# Patient Record
Sex: FEMALE | Race: BLACK OR AFRICAN AMERICAN | NOT HISPANIC OR LATINO | Employment: FULL TIME | URBAN - METROPOLITAN AREA
[De-identification: names, ages, dates, MRNs, and addresses within clinical notes are randomized per-mention and may not be internally consistent; named-entity substitution may affect disease eponyms.]

---

## 2017-07-05 ENCOUNTER — ACNE/ROSACEA (OUTPATIENT)
Dept: URBAN - METROPOLITAN AREA CLINIC 31 | Facility: CLINIC | Age: 27
Setting detail: DERMATOLOGY
End: 2017-07-05

## 2017-07-05 PROCEDURE — 11900 INJECT SKIN LESIONS </W 7: CPT

## 2017-07-05 PROCEDURE — 99202 OFFICE O/P NEW SF 15 MIN: CPT

## 2017-07-06 ENCOUNTER — RX ONLY (RX ONLY)
Age: 27
End: 2017-07-06

## 2017-07-06 RX ORDER — TRETINOIN 0.25 MG/G
CREAM TOPICAL
Qty: 45 | Refills: 3
Start: 2017-07-06

## 2017-07-06 RX ORDER — KETOCONAZOLE 20 MG/G
CREAM TOPICAL
Qty: 60 | Refills: 3
Start: 2017-07-06

## 2020-12-04 ENCOUNTER — TELEPHONE ENCOUNTER (OUTPATIENT)
Dept: URBAN - METROPOLITAN AREA CLINIC 92 | Facility: CLINIC | Age: 30
End: 2020-12-04

## 2020-12-04 RX ORDER — ESOMEPRAZOLE MAGNESIUM 40 MG
TAKE 1 CAPSULE (40 MG) BY ORAL ROUTE ONCE DAILY FOR 90 DAYS CAPSULE,DELAYED RELEASE (ENTERIC COATED) ORAL 1
OUTPATIENT
Start: 2020-02-07 | End: 2021-02-01

## 2020-12-04 RX ORDER — PANTOPRAZOLE SODIUM 40 MG/1
TAKE ONE TABLET BY MOUTH DAILY TABLET, DELAYED RELEASE ORAL ONCE A DAY
Qty: 90 | Refills: 3 | OUTPATIENT
Start: 2020-12-07

## 2024-03-19 ENCOUNTER — OFFICE VISIT (OUTPATIENT)
Dept: UROLOGY | Facility: CLINIC | Age: 34
End: 2024-03-19
Payer: COMMERCIAL

## 2024-03-19 VITALS
HEIGHT: 62 IN | SYSTOLIC BLOOD PRESSURE: 121 MMHG | RESPIRATION RATE: 18 BRPM | WEIGHT: 193.13 LBS | DIASTOLIC BLOOD PRESSURE: 82 MMHG | BODY MASS INDEX: 35.54 KG/M2 | HEART RATE: 61 BPM

## 2024-03-19 DIAGNOSIS — R30.0 DYSURIA: ICD-10-CM

## 2024-03-19 DIAGNOSIS — N39.0 RECURRENT UTI: Primary | ICD-10-CM

## 2024-03-19 LAB
BILIRUB UR QL STRIP: NEGATIVE
GLUCOSE UR QL STRIP: NEGATIVE
KETONES UR QL STRIP: NEGATIVE
LEUKOCYTE ESTERASE UR QL STRIP: NEGATIVE
PH, POC UA: 7
POC BLOOD, URINE: NEGATIVE
POC NITRATES, URINE: NEGATIVE
POC RESIDUAL URINE VOLUME: 52 ML (ref 0–100)
PROT UR QL STRIP: NEGATIVE
SP GR UR STRIP: 1.01 (ref 1–1.03)
UROBILINOGEN UR STRIP-ACNC: 0.2 (ref 0.1–1.1)

## 2024-03-19 PROCEDURE — 87563 M. GENITALIUM AMP PROBE: CPT | Performed by: UROLOGY

## 2024-03-19 PROCEDURE — 51798 US URINE CAPACITY MEASURE: CPT | Mod: PBBFAC,PN | Performed by: UROLOGY

## 2024-03-19 PROCEDURE — 87798 DETECT AGENT NOS DNA AMP: CPT | Mod: 59 | Performed by: UROLOGY

## 2024-03-19 PROCEDURE — 99999 PR PBB SHADOW E&M-EST. PATIENT-LVL IV: CPT | Mod: PBBFAC,,, | Performed by: UROLOGY

## 2024-03-19 PROCEDURE — 99204 OFFICE O/P NEW MOD 45 MIN: CPT | Mod: S$PBB,,, | Performed by: UROLOGY

## 2024-03-19 PROCEDURE — 81003 URINALYSIS AUTO W/O SCOPE: CPT | Mod: PBBFAC,PN | Performed by: UROLOGY

## 2024-03-19 PROCEDURE — 87086 URINE CULTURE/COLONY COUNT: CPT | Performed by: UROLOGY

## 2024-03-19 RX ORDER — CETIRIZINE HYDROCHLORIDE 10 MG/1
1 TABLET ORAL EVERY MORNING
COMMUNITY

## 2024-03-19 RX ORDER — TRETINOIN 0.5 MG/G
0.5 CREAM TOPICAL NIGHTLY
COMMUNITY
Start: 2024-03-07 | End: 2024-06-12

## 2024-03-19 RX ORDER — CLINDAMYCIN PHOSPHATE 10 MG/G
1 GEL TOPICAL 2 TIMES DAILY
COMMUNITY
Start: 2024-03-07 | End: 2025-03-07

## 2024-03-19 RX ORDER — KETOCONAZOLE 20 MG/G
2 CREAM TOPICAL DAILY
COMMUNITY
Start: 2024-03-07 | End: 2024-06-12

## 2024-03-19 NOTE — PROGRESS NOTES
Chief Complaint   Patient presents with    New Patient Visit     Patient is currently experiencing burning/discomfort during urination       Referring Provider: Dr. Julian Perez      History of Present Illness:   Gaby Hernadez is a 33 y.o. female here for evaluation of New Patient Visit (Patient is currently experiencing burning/discomfort during urination)    3/19/24-34yo female, here for evaluation of dysuria. Pt has been having dysuria since having ureaplasma in 2022. She was retested and it returned normal, however repeat testing was a vaginal swab rather than the initial urethral swab that was done. Happens once every 2 days, more so in the evening. Drinking about a gallon of water a day, so it hasn't burned as much lately. She doesn't eat a lot of spice. She does eat a lot of fruit-- primarily berries. Occasional oranges and kiwi and grapes. She reports that it is like a scraping pain. She hasn't been sexually active since then. She did improve with the initial course of antibiotics.         Review of Systems   Constitutional:  Negative for chills and fever.   Eyes:  Negative for visual disturbance.   Respiratory:  Negative for shortness of breath.    Cardiovascular:  Negative for chest pain.   Gastrointestinal:  Positive for constipation (IBS).   Genitourinary:  Positive for dysuria.   Hematological:  Does not bruise/bleed easily.         Past Medical History:   Diagnosis Date    GERD (gastroesophageal reflux disease)     IBS (irritable bowel syndrome)     OAB (overactive bladder)        Past Surgical History:   Procedure Laterality Date    LOOP ELECTROSURGICAL EXCISION PROCEDURE (LEEP)  12/13/2023    PARATHYROIDECTOMY      TONSILLECTOMY         Family History   Problem Relation Age of Onset    Hypertension Maternal Grandmother     Colon cancer Maternal Grandfather        Social History     Tobacco Use    Smoking status: Never    Smokeless tobacco: Never   Substance Use Topics    Alcohol use: Not  Currently    Drug use: Never       Current Outpatient Medications   Medication Sig Dispense Refill    albuterol (PROVENTIL/VENTOLIN HFA) 90 mcg/actuation inhaler Inhale 2 puffs into the lungs.      ascorbic acid, vitamin C, 500 mg/5 mL Liqd Take 500 mg by mouth once daily.      cetirizine (ZYRTEC) 10 MG tablet Take 1 tablet by mouth every morning.      clindamycin phosphate 1% (CLINDAGEL) 1 % gel Apply 1 % topically 2 (two) times daily.      ketoconazole (NIZORAL) 2 % cream Apply 2 % topically once daily.      tretinoin (RETIN-A) 0.05 % cream Apply 0.5 % topically nightly.      desogestreL-ethinyl estradioL (ENSKYCE) 0.15-0.03 mg per tablet Take 1 tablet by mouth once daily. (Patient not taking: Reported on 3/19/2024) 28 tablet 11     No current facility-administered medications for this visit.       Review of patient's allergies indicates:   Allergen Reactions    Penicillins Itching and Rash    Amoxicillin Rash    Gluten Other (See Comments)     Worsens digestive issues/constipation    Milk containing products (dairy) Other (See Comments)     constipation       Physical Exam  Vitals:    03/19/24 0917   BP: 121/82   Pulse: 61   Resp: 18     General: Well-developed, well-nourished, in no acute distress  HEENT: Normocephalic, atraumatic, extraocular movements intact  Neck: Supple, no supraclavicular or cervical lymphadenopathy, trachea midline  Respirations: even and unlabored  Back: midline spine, No CVA tenderness  Abdomen: soft, Non-tender, non-distended, no palpable masses, no rebound or guarding  : Normal external female genitalia without lesions. Orthotopic urethral meatus. healthy vaginal mucosa. No prolapse  Extremities: moves all equally, no clubbing, cyanosis or edema  Skin: Warm and dry. No lesions  Psych: normal affect  Neuro: Alert and oriented x 3. Cranial nerves II-XII intact      Urinalysis  Negative for blood, LE, nit      Assessment:  1. Recurrent UTI  Ambulatory referral/consult to Urology    POCT  Bladder Scan    POCT Urinalysis, Dipstick, Automated, W/O Scope    Urine culture    Mycoplasma genitalium Molecular Detection, PCR Urine    Ureaplasma PCR Urine    Mycoplasma genitalium Molecular Detection, PCR Urine    Ureaplasma PCR Urine    CANCELED: Mycoplasma genitalium Molecular Detection, PCR Urine    CANCELED: Ureaplasma PCR Urine      2. Dysuria              Plan:   Recurrent UTI  -     Ambulatory referral/consult to Urology  -     POCT Bladder Scan  -     POCT Urinalysis, Dipstick, Automated, W/O Scope  -     Urine culture  -     Cancel: Mycoplasma genitalium Molecular Detection, PCR Urine; Future; Expected date: 03/19/2024  -     Cancel: Ureaplasma PCR Urine; Future; Expected date: 03/19/2024  -     Mycoplasma genitalium Molecular Detection, PCR Urine; Future; Expected date: 03/20/2024  -     Ureaplasma PCR Urine; Future; Expected date: 03/20/2024  -     Mycoplasma genitalium Molecular Detection, PCR Urine  -     Ureaplasma PCR Urine    Dysuria      If all cultures are negative, will move forward with cysto    Follow up for Cystoscopy.

## 2024-03-20 DIAGNOSIS — N39.0 RECURRENT UTI: Primary | ICD-10-CM

## 2024-03-21 LAB
BACTERIA UR CULT: NO GROWTH
M GENITALIUM DNA UR QL NAA+PROBE: NEGATIVE
SPECIMEN SOURCE: NORMAL

## 2024-03-23 LAB
SPECIMEN SOURCE: NORMAL
U PARVUM DNA SPEC QL NAA+PROBE: NEGATIVE
U UREALYTICUM DNA SPEC QL NAA+PROBE: NEGATIVE

## 2024-04-09 ENCOUNTER — PROCEDURE VISIT (OUTPATIENT)
Dept: UROLOGY | Facility: CLINIC | Age: 34
End: 2024-04-09
Payer: COMMERCIAL

## 2024-04-09 VITALS
DIASTOLIC BLOOD PRESSURE: 77 MMHG | WEIGHT: 193.56 LBS | SYSTOLIC BLOOD PRESSURE: 119 MMHG | HEART RATE: 58 BPM | BODY MASS INDEX: 35.62 KG/M2 | HEIGHT: 62 IN | RESPIRATION RATE: 18 BRPM

## 2024-04-09 DIAGNOSIS — R30.0 DYSURIA: Primary | ICD-10-CM

## 2024-04-09 LAB
BILIRUB UR QL STRIP: NEGATIVE
GLUCOSE UR QL STRIP: NEGATIVE
KETONES UR QL STRIP: NEGATIVE
LEUKOCYTE ESTERASE UR QL STRIP: NEGATIVE
PH, POC UA: 8.5
POC BLOOD, URINE: NEGATIVE
POC NITRATES, URINE: NEGATIVE
PROT UR QL STRIP: NEGATIVE
SP GR UR STRIP: 1.01 (ref 1–1.03)
UROBILINOGEN UR STRIP-ACNC: 0.2 (ref 0.1–1.1)

## 2024-04-09 PROCEDURE — 81003 URINALYSIS AUTO W/O SCOPE: CPT | Mod: PBBFAC,PN | Performed by: UROLOGY

## 2024-04-09 PROCEDURE — 52000 CYSTOURETHROSCOPY: CPT | Mod: PBBFAC,PN | Performed by: UROLOGY

## 2024-04-09 PROCEDURE — 52000 CYSTOURETHROSCOPY: CPT | Mod: S$PBB,,, | Performed by: UROLOGY

## 2024-04-09 RX ORDER — LIDOCAINE HYDROCHLORIDE 20 MG/ML
JELLY TOPICAL
Status: COMPLETED | OUTPATIENT
Start: 2024-04-09 | End: 2024-04-09

## 2024-04-09 RX ORDER — CIPROFLOXACIN 500 MG/1
500 TABLET ORAL
Status: COMPLETED | OUTPATIENT
Start: 2024-04-09 | End: 2024-04-09

## 2024-04-09 RX ADMIN — CIPROFLOXACIN 500 MG: 500 TABLET ORAL at 08:04

## 2024-04-09 RX ADMIN — LIDOCAINE HYDROCHLORIDE 10 ML: 20 JELLY TOPICAL at 08:04

## 2024-04-09 NOTE — PROGRESS NOTES
.Patient came in for a cystoscopy, Ciprofloxacin 500 mg tab to be administered orally to help prevent infection. Confirmed patient's allergies, Ciprofloxacin 500 mg tab administered as ordered. Pt tolerated medication administration well. Patient agreed to wait 15 minutes after medication administration in the clinic and to report any adverse reactions.        Cedric Branham RN

## 2024-04-09 NOTE — PROCEDURES
Chief Complaint   Patient presents with    Procedure     Cystoscopy.         History of Present Illness:   Gaby Hernadez is a 33 y.o. female here for evaluation of Procedure (Cystoscopy.)    4/9/24-Here for cysto. Ureaplasma/mycoplasma testing negative.   3/19/24-34yo female, here for evaluation of dysuria. Pt has been having dysuria since having ureaplasma in 2022. She was retested and it returned normal, however repeat testing was a vaginal swab rather than the initial urethral swab that was done. Happens once every 2 days, more so in the evening. Drinking about a gallon of water a day, so it hasn't burned as much lately. She doesn't eat a lot of spice. She does eat a lot of fruit-- primarily berries. Occasional oranges and kiwi and grapes. She reports that it is like a scraping pain. She hasn't been sexually active since then. She did improve with the initial course of antibiotics.         Review of Systems   Constitutional:  Negative for chills and fever.   Eyes:  Negative for visual disturbance.   Respiratory:  Negative for shortness of breath.    Cardiovascular:  Negative for chest pain.   Gastrointestinal:  Positive for constipation (IBS).   Genitourinary:  Positive for dysuria.   Hematological:  Does not bruise/bleed easily.         Past Medical History:   Diagnosis Date    GERD (gastroesophageal reflux disease)     IBS (irritable bowel syndrome)     OAB (overactive bladder)        Past Surgical History:   Procedure Laterality Date    LOOP ELECTROSURGICAL EXCISION PROCEDURE (LEEP)  12/13/2023    PARATHYROIDECTOMY      TONSILLECTOMY         Family History   Problem Relation Age of Onset    Hypertension Maternal Grandmother     Colon cancer Maternal Grandfather        Social History     Tobacco Use    Smoking status: Never    Smokeless tobacco: Never   Substance Use Topics    Alcohol use: Not Currently    Drug use: Never       Current Outpatient Medications   Medication Sig Dispense Refill    albuterol  (PROVENTIL/VENTOLIN HFA) 90 mcg/actuation inhaler Inhale 2 puffs into the lungs.      ascorbic acid, vitamin C, 500 mg/5 mL Liqd Take 500 mg by mouth once daily.      cetirizine (ZYRTEC) 10 MG tablet Take 1 tablet by mouth every morning.      clindamycin phosphate 1% (CLINDAGEL) 1 % gel Apply 1 % topically 2 (two) times daily.      desogestreL-ethinyl estradioL (ENSKYCE) 0.15-0.03 mg per tablet Take 1 tablet by mouth once daily. 28 tablet 11    ketoconazole (NIZORAL) 2 % cream Apply 2 % topically once daily.      tretinoin (RETIN-A) 0.05 % cream Apply 0.5 % topically nightly.       No current facility-administered medications for this visit.       Review of patient's allergies indicates:   Allergen Reactions    Penicillins Itching and Rash    Amoxicillin Rash    Gluten Other (See Comments)     Worsens digestive issues/constipation    Milk containing products (dairy) Other (See Comments)     constipation       Physical Exam  Vitals:    04/09/24 0817   BP: 119/77   Pulse: (!) 58   Resp: 18     General: Well-developed, well-nourished, in no acute distress  HEENT: Normocephalic, atraumatic, extraocular movements intact  Neck: Supple, no supraclavicular or cervical lymphadenopathy, trachea midline  Respirations: even and unlabored  Back: midline spine, No CVA tenderness  Abdomen: soft, Non-tender, non-distended, no palpable masses, no rebound or guarding  : Normal external female genitalia without lesions. Orthotopic urethral meatus. healthy vaginal mucosa. No prolapse  Extremities: moves all equally, no clubbing, cyanosis or edema  Skin: Warm and dry. No lesions  Psych: normal affect  Neuro: Alert and oriented x 3. Cranial nerves II-XII intact      Urinalysis  Negative for blood, LE, nit    Procedure: Cystoscopy      Procedure in detail:   Informed consent was obtained. The patient's genitalia was prepped and draped in the usual sterile fashion. Lidocaine jelly was administered per urethra. I utilized the flexible  cystoscope and advanced it into the urethral meatus. Bladder access was obtained. Systematic review of the bladder was performed, noting no stones, foreign bodies or masses. Bilateral ureteral orifices were visualized and noted to be effluxing clear urine. Retroflexion was utilized to visualize the bladder neck, noting no lesions. The scope was slowly backed out of the urethra, noting no urethral lesions. The patient tolerated the procedure well. Estimated blood loss was 0cc.       Assessment:  1. Dysuria  POCT Urinalysis, Dipstick, Automated, W/O Scope            Plan:   Dysuria  -     POCT Urinalysis, Dipstick, Automated, W/O Scope    Other orders  -     ciprofloxacin HCl tablet 500 mg  -     LIDOcaine HCl 2% urojet    Discussed food journal and bladder irritants.     Follow up if symptoms worsen or fail to improve.

## 2024-06-17 ENCOUNTER — CLINICAL SUPPORT (OUTPATIENT)
Dept: REHABILITATION | Facility: HOSPITAL | Age: 34
End: 2024-06-17
Attending: STUDENT IN AN ORGANIZED HEALTH CARE EDUCATION/TRAINING PROGRAM
Payer: COMMERCIAL

## 2024-06-17 DIAGNOSIS — R10.2 PELVIC PAIN: ICD-10-CM

## 2024-06-17 DIAGNOSIS — M62.89 PELVIC FLOOR DYSFUNCTION: Primary | ICD-10-CM

## 2024-06-17 PROCEDURE — 97530 THERAPEUTIC ACTIVITIES: CPT | Mod: PN

## 2024-06-17 PROCEDURE — 97161 PT EVAL LOW COMPLEX 20 MIN: CPT | Mod: PN

## 2024-06-17 NOTE — PATIENT INSTRUCTIONS
"    URINARY URGE CONTROL   What to do when you "gotta go, gotta go"     FREEZE, BREATHE, SQUEEZE, repeat  Do this when you experience a strong urge to urinate and feel like you are going to leak to stop yourself from leaking.      FIRST - FREEZE: Do your best not to panic or rush to the toilet! You are more likely to leak if you do. Stop whatever you are doing, stand or sit quietly, and stay as calm as possible.     SECOND - BREATHE: Relax and take a few good deep breaths, letting it out slowly. This helps you further calm the nervous system and settles your bladder. Try thinking of something else to distract yourself from the urge (example: list categories of items like animals, fruits, cars, etc.)     THIRD - SQUEEZE: Do 5-10 "Quick Flicks" (quick LIFT and squeeze of pelvic floor muscles, followed by a full DROP). Pelvic floor contractions send a message to the bladder to relax and hold urine. Continue doing your best to remain calm and distract yourself from the urge.     FINALLY - REPEAT: Repeat this as many times as you need to on the way to the bathroom (i.e., every time the urge comes back). We only want to be walking calmly to the bathroom once the urge has passed. When you get inside and close the door behind you, repeat this process one last time so that you have enough time to get to the toilet and pull your pants down without rushing.        Remember......"Control your bladder before it controls YOU!"       "

## 2024-06-17 NOTE — PROGRESS NOTES
[facility]   Pelvic Health Physical Therapy Initial Evaluation     Date: 2024   Name: Gaby Hernadez  Clinic Number: [id number]    Therapy Diagnosis:   Encounter Diagnoses   Name Primary?    Pelvic pain     Pelvic floor dysfunction Yes     Physician: Julian Perez MD    Physician Orders: PT Eval and Treat   Medical Diagnosis from Referral: Pelvic pain [R10.2]   Evaluation Date: 2024  Authorization Period Expiration: 2024  Plan of Care Expiration: 2024  Progress Note Due: 2024  Visit # / Visits authorized:    FOTO: Issued Visit #: 1/3     Precautions: Standard    Time In: 1:40  Time Out: 2:20  Total Appointment Time (timed & untimed codes): 40 minutes    SUBJECTIVE     Date of onset: at least 6 years.     History of current condition - Gaby reports: having overactive bladder and constipation symptoms over the years. She reports fecal and urinary urge incontinence. She noticed symptoms were not improving so she would like to start Pelvic Floor PT.   Stool softner and prebiotic every day.    OB/GYN History:   Sexually active? No  Pain with vaginal exams, intercourse or tampon use? No    Bladder/Bowel History: urinary incontinence, trouble holding back gas/feces, and urinary frequency   Frequency of urination:   Daytime: depends on water intake, frequently, 12          Nighttime: 0  Difficulty initiating urine stream: No  Urine stream: strong  Complete emptying: Yes  Bladder leakage: Yes  Frequency of incidents: urge incontinence  Amount leaked (urine): teaspoon(s) and small squirt   Urinary Urgency: Yes, Able to delay the urge for at least 1-2 minute(s).  Frequency of bowel movements:  once every 3-4 days   Difficulty initiating BM: Yes  Quality/Shape of BM: [place] Stool Chart Type 1 or 2  Does Patient Feel Empty after BM? No  Fiber Supplements or Laxative Use? Yes  Colon leakage: Yes  Frequency of incidents: urge   Amount leaked (bowels): small  amount  Form of protection: panty liner  Number of pads required in 24 hours: 1-2     Pain:  No pain reported today.    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  Prior Level of Function: Pt was independent with all ADLs and iADLs without pain, no reports of incontinence of bowel or bladder.  Current Level of Function: Unable to control urinary or fecal urge at times.     Types of fluid intake: 55-60 oz water, juice, lemonade  Diet: plant-based foods recently to help with bowels   Habitus: well developed, well nourished  Abuse/Neglect: No     Patients goals: strengthen pelvic floor      Medical History: Gaby  has a past medical history of GERD (gastroesophageal reflux disease), IBS (irritable bowel syndrome), and OAB (overactive bladder).     Surgical History: Gaby Hernadez  has a past surgical history that includes Parathyroidectomy; Tonsillectomy; and Loop electrosurgical excision procedure (LEEP) (12/13/2023).    Medications: Gaby has a current medication list which includes the following prescription(s): albuterol, cetirizine, clindamycin phosphate 1%, and desogestrel-ethinyl estradiol.    Allergies:   Review of patient's allergies indicates:   Allergen Reactions    Penicillins Itching and Rash    Amoxicillin Rash    Gluten Other (See Comments)     Worsens digestive issues/constipation    Milk containing products (dairy) Other (See Comments)     constipation        OBJECTIVE     See EMR under MEDIA for written consent provided 6/17/2024  Chaperone: declined    ORTHO SCREEN  Posture in sitting: slouched   Posture in standing: WNL  Pelvic alignment: no sign of deviations noted in supine   Adductor Palpation: Not assessed today     ABDOMINAL WALL ASSESSMENT  Palpation: increased tension  and hypertonic  Abdominal strength:  Transverse abdominus: fair to good  Scarring: none noted   Diastasis: absent with curl up test      BREATHING MECHANICS ASSESSMENT   Thorax Assessment During Deep Respiration: WNL excursion of ribcage and WNL excursion of abdominal wall    VAGINAL PELVIC FLOOR EXAM    EXTERNAL ASSESSMENT  Introitus: WNL  Skin condition: WNL  Scarring: none   Sensation: WNL   Pain: none  Voluntary contraction: visible lift  Voluntary relaxation: visible drop  Involuntary contraction: reflex tightening  Bearing down: nil  Perineal descent: absent      INTERNAL ASSESSMENT  Pain: none   Sensation: able to localized pressure appropriately   Vaginal vault: stenotic   Muscle Bulk: hypertonus   Muscle Power: 2/5  Muscle Endurance: 3 sec  # Reps To Fatigue: 2    Fast Contractions in 10 seconds: Not assessed today      Quality of contraction: decreased hold   Specificity: WNL   Coordination: tends to hold breath during PFM contration   Prolapse check: none    Limitation/Restriction for FOTO Pelvic Floor Survey    Therapist reviewed FOTO scores for Gaby Hernadez on 6/17/2024.   FOTO documents entered into EPIC - see Media section.    Limitation Score:          TREATMENT     Total Treatment time (time-based codes) separate from Evaluation: 10 minutes     Therapeutic Activity to improve dynamic functional performance for 10 minutes including:    Reviewed HEP:  Diaphragmatic breathing  TA contraction  Bridges  Yajaira pose    Education on urge control techniques     PATIENT EDUCATION AND HOME EXERCISES     Education provided:   general anatomy/physiology of urinary/ bowel  system, benefits of treatment, and alternative methods of treatment were discussed with the patient. Additionally, anatomy/physiology of pelvic floor, posture/body mechanices, diaphragmatic breathing, isometric abdominal exercises, kegels, and urge control  were reviewed.     Written Home Exercises provided: yes.  Exercises were reviewed and Gaby was able to demonstrate  them prior to the end of the session. Gaby demonstrated good  understanding of the education provided. See EMR under Patient Instructions for exercises provided during therapy sessions.    ASSESSMENT     Gaby is a 33 y.o. female referred to outpatient Physical Therapy with a medical diagnosis of Pelvic pain [R10.2] . Pt presents with altered posture, poor knowledge of body mechanics and posture, poor trunk stability, decreased pelvic muscle strength, decreased endurance of the pelvic muscles, and increased frequency of urination.       Patient prognosis is Good.   Patient will benefit from skilled outpatient Physical Therapy to address the deficits stated above and in the chart below, provide patient/family education, and to maximize patient's level of independence.     Plan of care discussed with patient: Yes  Patient's spiritual, cultural and educational needs considered and patient is agreeable to the plan of care and goals as stated below:     Anticipated Barriers for therapy: see PMHx    Medical Necessity is demonstrated by the following:    History  Co-morbidities and personal factors that may impact the plan of care [x] LOW: no personal factors / co-morbidities  [] MODERATE: 1-2 personal factors / co-morbidities  [] HIGH: 3+ personal factors / co-morbidities    Moderate / High Support Documentation:   Co-morbidities affecting plan of care: n/a    Personal Factors:   no deficits     Examination  Body Structures and Functions, activity limitations and participation restrictions that may impact the plan of care [] LOW: addressing 1-2 elements  [x] MODERATE: 3+ elements  [] HIGH: 4+ elements (please support below)    Moderate / High Support Documentation: altered posture, poor knowledge of body mechanics and posture, poor trunk stability, decreased pelvic muscle strength, decreased endurance of the pelvic muscles, and increased frequency of urination     Clinical Presentation [x] LOW: stable  [] MODERATE:  Evolving  [] HIGH: Unstable     Decision Making/ Complexity Score: low        Goals:  Short Term Goals: 6 weeks   - Pt will demonstrate excellent knowledge and adherence to HEP to facilitate optimal recovery.  - Pt will demonstrate proper PFM contraction, relaxation, and lengthening coordinated with TA and breath for improved muscle coordination needed for functional activity.  - Pt will report a 25% reduction in frequency of leakage to demonstrate improved pelvic floor coordination needed for continence with ADLs.    Long Term Goals: 10 weeks   - Pt will demonstrate excellent knowledge and adherence to HEP for continued self-maintenance of symptoms.  - Pt will report FOTO score of 8% limited or less indicating clinically relevant increase in function.  - Pt will report voiding interval of 2-3 hours for improved ADL tolerance.  - Pt to be able to delay the urge to urinate at least 15 minutes with a strong urge to urinate in order to make it to the bathroom without leaking.  - Pt will report no incidence of urinary and fecal incontinence 6/7 days for improved hygiene and ADL/IADL tolerance.   - Pt will demonstrate PFM strength of at least 3/5 MMT for improved strength needed to maintain continence.   - Pt will be able to correctly and consistently explain urge control strategies to demonstrate understanding of these strategies and decrease likelihood of leakage.    PLAN     Plan of care Certification: 6/17/2024 to 08/26/2024.    Outpatient Physical Therapy 1 time(s) every 1-2 week(s) for 10 weeks to include the following interventions: Cervical/Lumbar Traction, Electrical Stimulation TENS/IFC, Manual Therapy, Moist Heat/ Ice, Neuromuscular Re-ed, Patient Education, Self Care, Therapeutic Activities, Therapeutic Exercise, and ASTYM, and FDN .     Nova Nathan, PT      I CERTIFY THE NEED FOR THESE SERVICES FURNISHED UNDER THIS PLAN OF TREATMENT AND WHILE UNDER MY CARE   Physician's comments:     Physician's  Signature: ___________________________________________________

## 2024-06-17 NOTE — PLAN OF CARE
[facility]   Pelvic Health Physical Therapy Initial Evaluation     Date: 2024   Name: Gaby Hernadez  Clinic Number: [id number]    Therapy Diagnosis:   Encounter Diagnoses   Name Primary?    Pelvic pain     Pelvic floor dysfunction Yes     Physician: Julian Perez MD    Physician Orders: PT Eval and Treat   Medical Diagnosis from Referral: Pelvic pain [R10.2]   Evaluation Date: 2024  Authorization Period Expiration: 2024  Plan of Care Expiration: 2024  Progress Note Due: 2024  Visit # / Visits authorized:    FOTO: Issued Visit #: 1/3     Precautions: Standard    Time In: 1:40  Time Out: 2:20  Total Appointment Time (timed & untimed codes): 40 minutes    SUBJECTIVE     Date of onset: at least 6 years.     History of current condition - Gaby reports: having overactive bladder and constipation symptoms over the years. She reports fecal and urinary urge incontinence. She noticed symptoms were not improving so she would like to start Pelvic Floor PT.   Stool softner and prebiotic every day.    OB/GYN History:   Sexually active? No  Pain with vaginal exams, intercourse or tampon use? No    Bladder/Bowel History: urinary incontinence, trouble holding back gas/feces, and urinary frequency   Frequency of urination:   Daytime: depends on water intake, frequently, 12          Nighttime: 0  Difficulty initiating urine stream: No  Urine stream: strong  Complete emptying: Yes  Bladder leakage: Yes  Frequency of incidents: urge incontinence  Amount leaked (urine): teaspoon(s) and small squirt   Urinary Urgency: Yes, Able to delay the urge for at least 1-2 minute(s).  Frequency of bowel movements: once every 3-4 days   Difficulty initiating BM: Yes  Quality/Shape of BM: [place] Stool Chart Type 1 or 2  Does Patient Feel Empty after BM? No  Fiber Supplements or Laxative Use? Yes  Colon leakage: Yes  Frequency of incidents: urge   Amount leaked (bowels): small  amount  Form of protection: panty liner  Number of pads required in 24 hours: 1-2     Pain:  No pain reported today.    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  Prior Level of Function: Pt was independent with all ADLs and iADLs without pain, no reports of incontinence of bowel or bladder.  Current Level of Function: Unable to control urinary or fecal urge at times.     Types of fluid intake: 55-60 oz water, juice, lemonade  Diet: plant-based foods recently to help with bowels   Habitus: well developed, well nourished  Abuse/Neglect: No     Patients goals: strengthen pelvic floor      Medical History: Gaby  has a past medical history of GERD (gastroesophageal reflux disease), IBS (irritable bowel syndrome), and OAB (overactive bladder).     Surgical History: Gaby Hernadez  has a past surgical history that includes Parathyroidectomy; Tonsillectomy; and Loop electrosurgical excision procedure (LEEP) (12/13/2023).    Medications: Gaby has a current medication list which includes the following prescription(s): albuterol, cetirizine, clindamycin phosphate 1%, and desogestrel-ethinyl estradiol.    Allergies:   Review of patient's allergies indicates:   Allergen Reactions    Penicillins Itching and Rash    Amoxicillin Rash    Gluten Other (See Comments)     Worsens digestive issues/constipation    Milk containing products (dairy) Other (See Comments)     constipation        OBJECTIVE     See EMR under MEDIA for written consent provided 6/17/2024  Chaperone: declined    ORTHO SCREEN  Posture in sitting: slouched   Posture in standing: WNL  Pelvic alignment: no sign of deviations noted in supine   Adductor Palpation: Not assessed today     ABDOMINAL WALL ASSESSMENT  Palpation: increased tension  and hypertonic  Abdominal strength:  Transverse abdominus: fair to good  Scarring: none noted   Diastasis: absent with curl up test      BREATHING MECHANICS ASSESSMENT   Thorax Assessment During Deep Respiration: WNL excursion of ribcage and WNL excursion of abdominal wall    VAGINAL PELVIC FLOOR EXAM    EXTERNAL ASSESSMENT  Introitus: WNL  Skin condition: WNL  Scarring: none   Sensation: WNL   Pain: none  Voluntary contraction: visible lift  Voluntary relaxation: visible drop  Involuntary contraction: reflex tightening  Bearing down: nil  Perineal descent: absent      INTERNAL ASSESSMENT  Pain: none   Sensation: able to localized pressure appropriately   Vaginal vault: stenotic   Muscle Bulk: hypertonus   Muscle Power: 2/5  Muscle Endurance: 3 sec  # Reps To Fatigue: 2    Fast Contractions in 10 seconds: Not assessed today      Quality of contraction: decreased hold   Specificity: WNL   Coordination: tends to hold breath during PFM contration   Prolapse check: none    Limitation/Restriction for FOTO Pelvic Floor Survey    Therapist reviewed FOTO scores for Gaby Hernadez on 6/17/2024.   FOTO documents entered into EPIC - see Media section.    Limitation Score:          TREATMENT     Total Treatment time (time-based codes) separate from Evaluation: 10 minutes     Therapeutic Activity to improve dynamic functional performance for 10 minutes including:    Reviewed HEP:  Diaphragmatic breathing  TA contraction  Bridges  Yajaira pose    Education on urge control techniques     PATIENT EDUCATION AND HOME EXERCISES     Education provided:   general anatomy/physiology of urinary/ bowel  system, benefits of treatment, and alternative methods of treatment were discussed with the patient. Additionally, anatomy/physiology of pelvic floor, posture/body mechanices, diaphragmatic breathing, isometric abdominal exercises, kegels, and urge control were reviewed.     Written Home Exercises provided: yes.  Exercises were reviewed and Gaby was able to demonstrate them  prior to the end of the session. Gaby demonstrated good  understanding of the education provided. See EMR under Patient Instructions for exercises provided during therapy sessions.    ASSESSMENT     Gaby is a 33 y.o. female referred to outpatient Physical Therapy with a medical diagnosis of Pelvic pain [R10.2] . Pt presents with altered posture, poor knowledge of body mechanics and posture, poor trunk stability, decreased pelvic muscle strength, decreased endurance of the pelvic muscles, and increased frequency of urination.       Patient prognosis is Good.   Patient will benefit from skilled outpatient Physical Therapy to address the deficits stated above and in the chart below, provide patient/family education, and to maximize patient's level of independence.     Plan of care discussed with patient: Yes  Patient's spiritual, cultural and educational needs considered and patient is agreeable to the plan of care and goals as stated below:     Anticipated Barriers for therapy: see PMHx    Medical Necessity is demonstrated by the following:    History  Co-morbidities and personal factors that may impact the plan of care [x] LOW: no personal factors / co-morbidities  [] MODERATE: 1-2 personal factors / co-morbidities  [] HIGH: 3+ personal factors / co-morbidities    Moderate / High Support Documentation:   Co-morbidities affecting plan of care: n/a    Personal Factors:   no deficits     Examination  Body Structures and Functions, activity limitations and participation restrictions that may impact the plan of care [] LOW: addressing 1-2 elements  [x] MODERATE: 3+ elements  [] HIGH: 4+ elements (please support below)    Moderate / High Support Documentation: altered posture, poor knowledge of body mechanics and posture, poor trunk stability, decreased pelvic muscle strength, decreased endurance of the pelvic muscles, and increased frequency of urination     Clinical Presentation [x] LOW: stable  [] MODERATE:  Evolving  [] HIGH: Unstable     Decision Making/ Complexity Score: low        Goals:  Short Term Goals: 6 weeks   - Pt will demonstrate excellent knowledge and adherence to HEP to facilitate optimal recovery.  - Pt will demonstrate proper PFM contraction, relaxation, and lengthening coordinated with TA and breath for improved muscle coordination needed for functional activity.  - Pt will report a 25% reduction in frequency of leakage to demonstrate improved pelvic floor coordination needed for continence with ADLs.    Long Term Goals: 10 weeks   - Pt will demonstrate excellent knowledge and adherence to HEP for continued self-maintenance of symptoms.  - Pt will report FOTO score of 8% limited or less indicating clinically relevant increase in function.  - Pt will report voiding interval of 2-3 hours for improved ADL tolerance.  - Pt to be able to delay the urge to urinate at least 15 minutes with a strong urge to urinate in order to make it to the bathroom without leaking.  - Pt will report no incidence of urinary and fecal incontinence 6/7 days for improved hygiene and ADL/IADL tolerance.   - Pt will demonstrate PFM strength of at least 3/5 MMT for improved strength needed to maintain continence.   - Pt will be able to correctly and consistently explain urge control strategies to demonstrate understanding of these strategies and decrease likelihood of leakage.    PLAN     Plan of care Certification: 6/17/2024 to 08/26/2024.    Outpatient Physical Therapy 1 time(s) every 1-2 week(s) for 10 weeks to include the following interventions: Cervical/Lumbar Traction, Electrical Stimulation TENS/IFC, Manual Therapy, Moist Heat/ Ice, Neuromuscular Re-ed, Patient Education, Self Care, Therapeutic Activities, Therapeutic Exercise, and ASTYM, and FDN.     Nova Nathan, PT      I CERTIFY THE NEED FOR THESE SERVICES FURNISHED UNDER THIS PLAN OF TREATMENT AND WHILE UNDER MY CARE   Physician's comments:     Physician's Signature:  ___________________________________________________